# Patient Record
Sex: MALE | Race: WHITE | HISPANIC OR LATINO | URBAN - METROPOLITAN AREA
[De-identification: names, ages, dates, MRNs, and addresses within clinical notes are randomized per-mention and may not be internally consistent; named-entity substitution may affect disease eponyms.]

---

## 2018-06-14 ENCOUNTER — OFFICE VISIT CONVERTED (OUTPATIENT)
Dept: FAMILY MEDICINE CLINIC | Facility: CLINIC | Age: 28
End: 2018-06-14
Attending: NURSE PRACTITIONER

## 2019-06-13 ENCOUNTER — OFFICE VISIT CONVERTED (OUTPATIENT)
Dept: FAMILY MEDICINE CLINIC | Facility: CLINIC | Age: 29
End: 2019-06-13
Attending: NURSE PRACTITIONER

## 2019-06-13 ENCOUNTER — HOSPITAL ENCOUNTER (OUTPATIENT)
Dept: FAMILY MEDICINE CLINIC | Facility: CLINIC | Age: 29
Discharge: HOME | End: 2019-06-13
Attending: NURSE PRACTITIONER

## 2019-06-13 LAB
ALBUMIN SERPL-MCNC: 4.6 G/DL (ref 3.5–5)
ALBUMIN/GLOB SERPL: 1.7 {RATIO} (ref 1.4–2.6)
ALP SERPL-CCNC: 125 U/L (ref 53–128)
ALT SERPL-CCNC: 22 U/L (ref 10–40)
ANION GAP SERPL CALC-SCNC: 15 MMOL/L (ref 8–19)
AST SERPL-CCNC: 20 U/L (ref 15–50)
BASOPHILS # BLD AUTO: 0.03 10*3/UL (ref 0–0.2)
BASOPHILS NFR BLD AUTO: 0.6 % (ref 0–3)
BILIRUB SERPL-MCNC: 0.35 MG/DL (ref 0.2–1.3)
BUN SERPL-MCNC: 11 MG/DL (ref 5–25)
BUN/CREAT SERPL: 12 {RATIO} (ref 6–20)
CALCIUM SERPL-MCNC: 9.3 MG/DL (ref 8.7–10.4)
CHLORIDE SERPL-SCNC: 100 MMOL/L (ref 99–111)
CHOLEST SERPL-MCNC: 165 MG/DL (ref 107–200)
CHOLEST/HDLC SERPL: 3.5 {RATIO} (ref 3–6)
CONV ABS IMM GRAN: 0.02 10*3/UL (ref 0–0.2)
CONV CO2: 29 MMOL/L (ref 22–32)
CONV IMMATURE GRAN: 0.4 % (ref 0–1.8)
CONV TOTAL PROTEIN: 7.3 G/DL (ref 6.3–8.2)
CREAT UR-MCNC: 0.94 MG/DL (ref 0.7–1.2)
DEPRECATED RDW RBC AUTO: 40.3 FL (ref 35.1–43.9)
EOSINOPHIL # BLD AUTO: 0.1 10*3/UL (ref 0–0.7)
EOSINOPHIL # BLD AUTO: 2 % (ref 0–7)
ERYTHROCYTE [DISTWIDTH] IN BLOOD BY AUTOMATED COUNT: 12.9 % (ref 11.6–14.4)
GFR SERPLBLD BASED ON 1.73 SQ M-ARVRAT: >60 ML/MIN/{1.73_M2}
GLOBULIN UR ELPH-MCNC: 2.7 G/DL (ref 2–3.5)
GLUCOSE SERPL-MCNC: 82 MG/DL (ref 70–99)
HBA1C MFR BLD: 15.3 G/DL (ref 14–18)
HCT VFR BLD AUTO: 46.6 % (ref 42–52)
HDLC SERPL-MCNC: 47 MG/DL (ref 40–60)
LDLC SERPL CALC-MCNC: 80 MG/DL (ref 70–100)
LYMPHOCYTES # BLD AUTO: 1.65 10*3/UL (ref 1–5)
MCH RBC QN AUTO: 28.4 PG (ref 27–31)
MCHC RBC AUTO-ENTMCNC: 32.8 G/DL (ref 33–37)
MCV RBC AUTO: 86.6 FL (ref 80–96)
MONOCYTES # BLD AUTO: 0.46 10*3/UL (ref 0.2–1.2)
MONOCYTES NFR BLD AUTO: 9.4 % (ref 3–10)
NEUTROPHILS # BLD AUTO: 2.62 10*3/UL (ref 2–8)
NEUTROPHILS NFR BLD AUTO: 53.8 % (ref 30–85)
NRBC CBCN: 0 % (ref 0–0.7)
OSMOLALITY SERPL CALC.SUM OF ELEC: 286 MOSM/KG (ref 273–304)
PLATELET # BLD AUTO: 227 10*3/UL (ref 130–400)
PMV BLD AUTO: 10.4 FL (ref 9.4–12.4)
POTASSIUM SERPL-SCNC: 4.5 MMOL/L (ref 3.5–5.3)
RBC # BLD AUTO: 5.38 10*6/UL (ref 4.7–6.1)
SODIUM SERPL-SCNC: 139 MMOL/L (ref 135–147)
TRIGL SERPL-MCNC: 190 MG/DL (ref 40–150)
TSH SERPL-ACNC: 1.18 M[IU]/L (ref 0.27–4.2)
VARIANT LYMPHS NFR BLD MANUAL: 33.8 % (ref 20–45)
VLDLC SERPL-MCNC: 38 MG/DL (ref 5–37)
WBC # BLD AUTO: 4.88 10*3/UL (ref 4.8–10.8)

## 2019-08-02 ENCOUNTER — HOSPITAL ENCOUNTER (OUTPATIENT)
Dept: SLEEP MEDICINE | Facility: HOSPITAL | Age: 29
Discharge: HOME | End: 2019-08-02
Attending: INTERNAL MEDICINE

## 2019-09-23 ENCOUNTER — HOSPITAL ENCOUNTER (OUTPATIENT)
Dept: SLEEP MEDICINE | Facility: HOSPITAL | Age: 29
Discharge: HOME | End: 2019-09-23
Attending: INTERNAL MEDICINE

## 2020-11-10 ENCOUNTER — HOSPITAL ENCOUNTER (OUTPATIENT)
Dept: FAMILY MEDICINE CLINIC | Facility: CLINIC | Age: 30
Discharge: HOME | End: 2020-11-10
Attending: NURSE PRACTITIONER

## 2020-11-10 ENCOUNTER — OFFICE VISIT CONVERTED (OUTPATIENT)
Dept: FAMILY MEDICINE CLINIC | Facility: CLINIC | Age: 30
End: 2020-11-10
Attending: NURSE PRACTITIONER

## 2020-11-10 LAB
ALBUMIN SERPL-MCNC: 4.6 G/DL (ref 3.5–5)
ALBUMIN/GLOB SERPL: 1.8 {RATIO} (ref 1.4–2.6)
ALP SERPL-CCNC: 106 U/L (ref 53–128)
ALT SERPL-CCNC: 24 U/L (ref 10–40)
ANION GAP SERPL CALC-SCNC: 13 MMOL/L (ref 8–19)
AST SERPL-CCNC: 22 U/L (ref 15–50)
BASOPHILS # BLD AUTO: 0.03 10*3/UL (ref 0–0.2)
BASOPHILS NFR BLD AUTO: 0.5 % (ref 0–3)
BILIRUB SERPL-MCNC: 0.42 MG/DL (ref 0.2–1.3)
BUN SERPL-MCNC: 12 MG/DL (ref 5–25)
BUN/CREAT SERPL: 12 {RATIO} (ref 6–20)
CALCIUM SERPL-MCNC: 9.2 MG/DL (ref 8.7–10.4)
CHLORIDE SERPL-SCNC: 100 MMOL/L (ref 99–111)
CHOLEST SERPL-MCNC: 159 MG/DL (ref 107–200)
CHOLEST/HDLC SERPL: 2.9 {RATIO} (ref 3–6)
CONV ABS IMM GRAN: 0.01 10*3/UL (ref 0–0.2)
CONV CO2: 29 MMOL/L (ref 22–32)
CONV IMMATURE GRAN: 0.2 % (ref 0–1.8)
CONV TOTAL PROTEIN: 7.1 G/DL (ref 6.3–8.2)
CREAT UR-MCNC: 1 MG/DL (ref 0.7–1.2)
DEPRECATED RDW RBC AUTO: 39.6 FL (ref 35.1–43.9)
EOSINOPHIL # BLD AUTO: 0.08 10*3/UL (ref 0–0.7)
EOSINOPHIL # BLD AUTO: 1.4 % (ref 0–7)
ERYTHROCYTE [DISTWIDTH] IN BLOOD BY AUTOMATED COUNT: 12.7 % (ref 11.6–14.4)
GFR SERPLBLD BASED ON 1.73 SQ M-ARVRAT: >60 ML/MIN/{1.73_M2}
GLOBULIN UR ELPH-MCNC: 2.5 G/DL (ref 2–3.5)
GLUCOSE SERPL-MCNC: 80 MG/DL (ref 70–99)
HCT VFR BLD AUTO: 46.8 % (ref 42–52)
HDLC SERPL-MCNC: 55 MG/DL (ref 40–60)
HGB BLD-MCNC: 15.4 G/DL (ref 14–18)
LDLC SERPL CALC-MCNC: 80 MG/DL (ref 70–100)
LYMPHOCYTES # BLD AUTO: 2.12 10*3/UL (ref 1–5)
LYMPHOCYTES NFR BLD AUTO: 37.5 % (ref 20–45)
MCH RBC QN AUTO: 28.4 PG (ref 27–31)
MCHC RBC AUTO-ENTMCNC: 32.9 G/DL (ref 33–37)
MCV RBC AUTO: 86.3 FL (ref 80–96)
MONOCYTES # BLD AUTO: 0.49 10*3/UL (ref 0.2–1.2)
MONOCYTES NFR BLD AUTO: 8.7 % (ref 3–10)
NEUTROPHILS # BLD AUTO: 2.92 10*3/UL (ref 2–8)
NEUTROPHILS NFR BLD AUTO: 51.7 % (ref 30–85)
NRBC CBCN: 0 % (ref 0–0.7)
OSMOLALITY SERPL CALC.SUM OF ELEC: 285 MOSM/KG (ref 273–304)
PLATELET # BLD AUTO: 231 10*3/UL (ref 130–400)
PMV BLD AUTO: 9.8 FL (ref 9.4–12.4)
POTASSIUM SERPL-SCNC: 4.3 MMOL/L (ref 3.5–5.3)
RBC # BLD AUTO: 5.42 10*6/UL (ref 4.7–6.1)
SODIUM SERPL-SCNC: 138 MMOL/L (ref 135–147)
TRIGL SERPL-MCNC: 119 MG/DL (ref 40–150)
TSH SERPL-ACNC: 1.46 M[IU]/L (ref 0.27–4.2)
VLDLC SERPL-MCNC: 24 MG/DL (ref 5–37)
WBC # BLD AUTO: 5.65 10*3/UL (ref 4.8–10.8)

## 2021-04-09 ENCOUNTER — HOSPITAL ENCOUNTER (OUTPATIENT)
Dept: VACCINE CLINIC | Facility: HOSPITAL | Age: 31
Discharge: HOME | End: 2021-04-09
Attending: INTERNAL MEDICINE

## 2021-05-07 ENCOUNTER — HOSPITAL ENCOUNTER (OUTPATIENT)
Dept: VACCINE CLINIC | Facility: HOSPITAL | Age: 31
Discharge: HOME | End: 2021-05-07
Attending: INTERNAL MEDICINE

## 2021-05-13 NOTE — PROGRESS NOTES
Progress Note      Patient Name: Sherman Ramsey   Patient ID: [id number]   Sex: Male   YOB: 1990    Primary Care Provider: Christine NICOLE   Referring Provider: Christine NICOLE    Visit Date: November 10, 2020    Provider: BONNIE Laar   Location: [facility]   Location Address: [address] [place] [address]  [id number]   Location Phone: [phone]          Chief Complaint  · follow up for ADD, Depressive disorder, and obstructive sleep apnea mild       History Of Present Illness  Sherman Ramsey is a 30 year old /White male who presents for evaluation and treatment of:      follow up for ADD,  Depressive disorder, and obstructive sleep apnea mild   medication refills    c/o- right arm goes numb if he lays down  Pt states that the Wellbutrin is working well.    sleep study 9.2019  no follow up since that time   dx mild delmar       wants flu shot          Past Medical History  Disease Name Date Onset Notes   Depressive Disorder --  --          Past Surgical History  Procedure Name Date Notes   Clavicle Fracture Surgery - ORIF --  --          Medication List  Name Date Started Instructions   atomoxetine 100 mg oral capsule 11/10/2020 TAKE 1 CAPSULE(100 MG) BY MOUTH EVERY DAY for 90 days   multivitamin oral tablet  take 1 tablet by oral route daily   Wellbutrin  mg oral tablet extended release 24 hr 11/10/2020 1 tablet once daily with one 300 mg   Wellbutrin  mg oral tablet extended release 24 hr 11/10/2020 TAKE 1 TABLET (300 MG) BY ORAL ROUTE ONCE DAILY FOR 90 DAYS for 90 days         Allergy List  Allergen Name Date Reaction Notes   NO KNOWN DRUG ALLERGIES --  --  --          Family Medical History  Disease Name Relative/Age Notes   Heart Disease Grandfather (maternal)/   --    Diabetes, unspecified type Grandfather (maternal)/   --          Social History  Finding Status Start/Stop Quantity Notes  "  Tobacco Never --/-- --  --          Immunizations  NameDate Admin Mfg Trade Name Lot Number Route Inj VIS Given VIS Publication   Asqgbcnok15/10/2020 [facility] FLUARIX [id number] IM LD 11/10/2020 08/15/2019   Comments: ndc [id number]   MMR02/11/2016 MSD M-M-R II [id number] SC  02/11/2016 04/20/2012   Comments: pt tolerated well   Tdap02/11/2016 [facility] Unknown TradeName [id number]  02/11/2016 08/07/2015   Comments:          Review of Systems  · Constitutional  o Denies  o : fatigue, fever, chills  · Eyes  o Denies  o : changes in vision  · HENT  o Denies  o : ear pain, sore throat  · Cardiovascular  o Denies  o : chest Pain  · Respiratory  o Denies  o : frequent cough, shortness of breath  · Gastrointestinal  o Denies  o : nausea, vomiting, changes in bowel habits  · Genitourinary  o Denies  o : dysuria  · Neurologic  o Admits  o : tingling or numbness  o Denies  o : headache, dizziness  · Musculoskeletal  o Denies  o : joint pain  · Psychiatric  o Admits  o : depression, inattentiveness  o Denies  o : SI/HI  · Allergic-Immunologic  o Denies  o : eczema, seasonal allergies, urticaria      Vitals  Date Time BP Position Site L\R Cuff Size HR RR TEMP (F) WT  HT  BMI kg/m2 BSA m2 O2 Sat FR L/min FiO2 HC       06/14/2018 09:45 /69 Sitting    98 - R  98.3 169lbs 0oz 6'  1\" 22.3 1.99 98 %      06/13/2019 02:35 /79 Sitting    88 - R 18 97.8 184lbs 0oz 6'  1\" 24.28 2.07 95 %  21%    11/10/2020 11:37 /76 Sitting    110 - R 20 97.7 174lbs 0oz 6'  1\" 22.96 2.02 98 %            Physical Examination  · Constitutional  o Appearance  o : well-nourished, in no acute distress  · Eyes  o Conjunctivae  o : conjunctivae normal  o Sclerae  o : sclerae white  o Pupils and Irises  o : pupils equal and round  o Eyelids/Ocular Adnexae  o : eyelid appearance normal, no exudates present  · Ears, Nose, Mouth and Throat  o Ears  o :   § External Ears  § : external auditory canal appearance within normal limits, no discharge present  § Otoscopic " Examination  § : tympanic membrane appearance within normal limits bilaterally  o Nose  o :   § External Nose  § : appearance normal  § Intranasal Exam  § : mucosa within normal limits  § Nasopharynx  § : no discharge present  o Oral Cavity  o :   § Oral Mucosa  § : oral mucosa normal  o Throat  o :   § Oropharynx  § : no inflammation or lesions present, tonsils within normal limits  · Neck  o Inspection/Palpation  o : normal appearance, trachea midline  o Thyroid  o : gland size normal, nontender  · Respiratory  o Respiratory Effort  o : breathing unlabored  o Auscultation of Lungs  o : normal breath sounds throughout inspiration and expiration  · Cardiovascular  o Heart  o :   § Auscultation of Heart  § : regular rate and rhythm, no murmurs  o Peripheral Vascular System  o :   § Carotid Arteries  § : no bruits present  § Extremities  § : no clubbing or edema  · Gastrointestinal  o Abdominal Examination  o : abdomen nontender to palpation, bowel sounds present   · Lymphatic  o Neck  o : no lymphadenopathy present  · Skin and Subcutaneous Tissue  o General Inspection  o : pink, warm, dry   · Neurologic  o Mental Status Examination  o :   § Orientation  § : grossly oriented to person, place and time  o Gait and Station  o : normal gait, able to stand without difficulty  · Psychiatric  o Judgement and Insight  o : judgment and insight intact  o Mood and Affect  o : mood normal, affect appropriate              Assessment  · Screening for depression     V79.0/Z13.89  · Need for influenza vaccination     V04.81/Z23  · Annual physical exam     V70.0/Z00.00  · Depressive Disorder     311/F32.9  controlled at this time   · ADD (attention deficit disorder)     314.00/F98.8  stable at this time   · VERONICA (obstructive sleep apnea)     327.23/G47.33  pt declines follow up with sleep center      Plan  · Orders  o Immunization Admin Fee (Single) ([facility]) (42361) - V04.81/Z23 - 11/10/2020  o Fluarix QUADRIVALENT Vaccine, Syringe, Latex  Free, Preservative Free, age 3+ (73414) - V04.81/Z23 - 11/10/2020   Vaccine - Influenza; Dose: 0.5; Site: Left Deltoid; Route: Intramuscular; Date: 11/10/2020 11:40:00; Exp: 2021; Lot: [id number]; Mfg: sanofi pasteur; TradeName: FLUARIX; Administered By: Audrey Hughes MA; Comment: ndc [id number]   Vaccine - Influenza; Dose: 0.5; Site: Left Deltoid; Route: Intramuscular; Date: 11/10/2020 11:40:00; Exp: 2021; Lot: [id number]; Mfg: sanofi pasteur; TradeName: FLUARIX; Administered By: Audrey Hughes MA; Comment: ndc [phone]  o Physical, Primary Care Panel (CBC, CMP, Lipid, TSH) [facility] ([id number], [id number], [id number], [id number]) - - 11/10/2020  o ACO-39: Current medications updated and reviewed (1159F, ) - - 11/10/2020  o ACO-18: Negative screen for clinical depression using a standardized tool () - - 11/10/2020  o ACO-14: Influenza immunization administered or previously received [facility] () - - 11/10/2020  · Medications  o atomoxetine 100 mg oral capsule   SIG: TAKE 1 CAPSULE(100 MG) BY MOUTH EVERY DAY for 90 days   DISP: (90) Capsule with 3 refills  Adjusted on 11/10/2020     o Wellbutrin  mg oral tablet extended release 24 hr   SI tablet once daily with one 300 mg   DISP: (90) Tablet with 3 refills  Adjusted on 11/10/2020     o Wellbutrin  mg oral tablet extended release 24 hr   SIG: TAKE 1 TABLET (300 MG) BY ORAL ROUTE ONCE DAILY FOR 90 DAYS for 90 days   DISP: (90) Tablet with 3 refills  Adjusted on 11/10/2020     o Medications have been Reconciled  o Transition of Care or Provider Policy  · Instructions  o Depression Screen completed and scanned into the EMR under the designated folder within the patient's documents.  o Today's PHQ-9 result is 4  o Reviewed health maintenance flowsheet and updated information. Orders were placed and/or patient's response was documented.  o Patient was educated/instructed on their diagnosis, treatment and medications prior to discharge from the clinic  today.  · Disposition  o follow up in one year for wellness exam            Electronically Signed by: BONNIE Lara -Author on November 10, 2020 12:14:53 PM

## 2021-05-14 VITALS
RESPIRATION RATE: 20 BRPM | TEMPERATURE: 97.7 F | SYSTOLIC BLOOD PRESSURE: 121 MMHG | HEART RATE: 110 BPM | OXYGEN SATURATION: 98 % | HEIGHT: 73 IN | WEIGHT: 174 LBS | BODY MASS INDEX: 23.06 KG/M2 | DIASTOLIC BLOOD PRESSURE: 76 MMHG

## 2021-05-15 VITALS
SYSTOLIC BLOOD PRESSURE: 120 MMHG | BODY MASS INDEX: 24.39 KG/M2 | TEMPERATURE: 97.8 F | RESPIRATION RATE: 18 BRPM | OXYGEN SATURATION: 95 % | DIASTOLIC BLOOD PRESSURE: 79 MMHG | HEIGHT: 73 IN | HEART RATE: 88 BPM | WEIGHT: 184 LBS

## 2021-05-16 VITALS
WEIGHT: 169 LBS | HEART RATE: 98 BPM | BODY MASS INDEX: 22.4 KG/M2 | DIASTOLIC BLOOD PRESSURE: 69 MMHG | OXYGEN SATURATION: 98 % | TEMPERATURE: 98.3 F | SYSTOLIC BLOOD PRESSURE: 110 MMHG | HEIGHT: 73 IN